# Patient Record
Sex: FEMALE | Race: WHITE | Employment: OTHER | URBAN - METROPOLITAN AREA
[De-identification: names, ages, dates, MRNs, and addresses within clinical notes are randomized per-mention and may not be internally consistent; named-entity substitution may affect disease eponyms.]

---

## 2017-08-07 ENCOUNTER — HOSPITAL ENCOUNTER (OUTPATIENT)
Dept: ULTRASOUND IMAGING | Age: 62
Discharge: HOME | End: 2017-08-07

## 2017-08-07 DIAGNOSIS — R74.01 ELEVATED TRANSAMINASE LEVEL: ICD-10-CM

## 2017-08-07 PROCEDURE — 76705 ECHO EXAM OF ABDOMEN: CPT

## 2023-06-21 ENCOUNTER — CLINICAL DOCUMENTATION (OUTPATIENT)
Age: 68
End: 2023-06-21

## 2023-07-31 ENCOUNTER — CLINICAL DOCUMENTATION (OUTPATIENT)
Age: 68
End: 2023-07-31

## 2023-08-10 ENCOUNTER — CLINICAL DOCUMENTATION (OUTPATIENT)
Age: 68
End: 2023-08-10

## 2024-03-18 RX ORDER — SODIUM CHLORIDE 9 MG/ML
25 INJECTION, SOLUTION INTRAVENOUS PRN
Status: DISCONTINUED | OUTPATIENT
Start: 2024-03-18 | End: 2024-03-19 | Stop reason: HOSPADM

## 2024-03-18 RX ORDER — SODIUM CHLORIDE 0.9 % (FLUSH) 0.9 %
5-40 SYRINGE (ML) INJECTION EVERY 12 HOURS SCHEDULED
Status: DISCONTINUED | OUTPATIENT
Start: 2024-03-18 | End: 2024-03-19 | Stop reason: HOSPADM

## 2024-03-18 RX ORDER — MULTIVIT-MIN/IRON/FOLIC ACID/K 18-600-40
CAPSULE ORAL DAILY
COMMUNITY

## 2024-03-18 RX ORDER — SODIUM CHLORIDE 0.9 % (FLUSH) 0.9 %
5-40 SYRINGE (ML) INJECTION PRN
Status: DISCONTINUED | OUTPATIENT
Start: 2024-03-18 | End: 2024-03-19 | Stop reason: HOSPADM

## 2024-03-19 ENCOUNTER — HOSPITAL ENCOUNTER (OUTPATIENT)
Facility: HOSPITAL | Age: 69
Setting detail: OUTPATIENT SURGERY
Discharge: HOME | End: 2024-03-19
Attending: SPECIALIST | Admitting: SPECIALIST
Payer: MEDICARE

## 2024-03-19 ENCOUNTER — ANESTHESIA EVENT (OUTPATIENT)
Facility: HOSPITAL | Age: 69
End: 2024-03-19
Payer: MEDICARE

## 2024-03-19 ENCOUNTER — ANESTHESIA (OUTPATIENT)
Facility: HOSPITAL | Age: 69
End: 2024-03-19
Payer: MEDICARE

## 2024-03-19 VITALS
DIASTOLIC BLOOD PRESSURE: 73 MMHG | TEMPERATURE: 97.8 F | WEIGHT: 149 LBS | BODY MASS INDEX: 27.42 KG/M2 | HEART RATE: 70 BPM | OXYGEN SATURATION: 98 % | SYSTOLIC BLOOD PRESSURE: 156 MMHG | HEIGHT: 62 IN | RESPIRATION RATE: 14 BRPM

## 2024-03-19 PROCEDURE — 3600007512: Performed by: SPECIALIST

## 2024-03-19 PROCEDURE — 3600007502: Performed by: SPECIALIST

## 2024-03-19 PROCEDURE — 2720000010 HC SURG SUPPLY STERILE: Performed by: SPECIALIST

## 2024-03-19 PROCEDURE — 2580000003 HC RX 258

## 2024-03-19 PROCEDURE — 6360000002 HC RX W HCPCS

## 2024-03-19 PROCEDURE — 2709999900 HC NON-CHARGEABLE SUPPLY: Performed by: SPECIALIST

## 2024-03-19 PROCEDURE — 3700000001 HC ADD 15 MINUTES (ANESTHESIA): Performed by: SPECIALIST

## 2024-03-19 PROCEDURE — 7100000010 HC PHASE II RECOVERY - FIRST 15 MIN: Performed by: SPECIALIST

## 2024-03-19 PROCEDURE — 7100000011 HC PHASE II RECOVERY - ADDTL 15 MIN: Performed by: SPECIALIST

## 2024-03-19 PROCEDURE — 2580000003 HC RX 258: Performed by: SPECIALIST

## 2024-03-19 PROCEDURE — 2500000003 HC RX 250 WO HCPCS

## 2024-03-19 PROCEDURE — 3700000000 HC ANESTHESIA ATTENDED CARE: Performed by: SPECIALIST

## 2024-03-19 RX ORDER — LIDOCAINE HYDROCHLORIDE 20 MG/ML
INJECTION, SOLUTION EPIDURAL; INFILTRATION; INTRACAUDAL; PERINEURAL PRN
Status: DISCONTINUED | OUTPATIENT
Start: 2024-03-19 | End: 2024-03-19 | Stop reason: SDUPTHER

## 2024-03-19 RX ORDER — 0.9 % SODIUM CHLORIDE 0.9 %
INTRAVENOUS SOLUTION INTRAVENOUS PRN
Status: DISCONTINUED | OUTPATIENT
Start: 2024-03-19 | End: 2024-03-19 | Stop reason: SDUPTHER

## 2024-03-19 RX ORDER — PHENYLEPHRINE HCL IN 0.9% NACL 0.4MG/10ML
SYRINGE (ML) INTRAVENOUS PRN
Status: DISCONTINUED | OUTPATIENT
Start: 2024-03-19 | End: 2024-03-19 | Stop reason: SDUPTHER

## 2024-03-19 RX ADMIN — Medication 80 MCG: at 08:22

## 2024-03-19 RX ADMIN — LIDOCAINE HYDROCHLORIDE 50 MG: 20 INJECTION, SOLUTION EPIDURAL; INFILTRATION; INTRACAUDAL; PERINEURAL at 08:17

## 2024-03-19 RX ADMIN — Medication 80 MCG: at 08:31

## 2024-03-19 RX ADMIN — SODIUM CHLORIDE 25 ML: 9 INJECTION, SOLUTION INTRAVENOUS at 07:33

## 2024-03-19 RX ADMIN — SODIUM CHLORIDE 500 ML: 900 INJECTION, SOLUTION INTRAVENOUS at 08:32

## 2024-03-19 RX ADMIN — PROPOFOL 220 MG: 10 INJECTION, EMULSION INTRAVENOUS at 08:31

## 2024-03-19 ASSESSMENT — PAIN - FUNCTIONAL ASSESSMENT: PAIN_FUNCTIONAL_ASSESSMENT: 0-10

## 2024-03-19 ASSESSMENT — PAIN SCALES - GENERAL: PAINLEVEL_OUTOF10: 0

## 2024-03-19 NOTE — DISCHARGE INSTRUCTIONS
Lillian Pettit  [id number]  1955    COLON DISCHARGE INSTRUCTIONS  Discomfort:  Redness at IV site- apply warm compress to area; if redness or soreness persist- contact your physician  There may be a slight amount of blood passed from the rectum  Gaseous discomfort- walking, belching will help relieve any discomfort  You may not operate a vehicle for 12 hours  You may not engage in an occupation involving machinery or appliances for rest of today  You may not drink alcoholic beverages for at least 12 hours  Avoid making any critical decisions for at least 24 hour  DIET:   Regular diet.   - however -  remember your colon is empty and a heavy meal will produce gas.   Avoid these foods:  vegetables, fried / greasy foods, carbonated drinks for today.    MEDICATIONS:        Regarding Aspirin or Nonsteroidal medications, please see below.    ACTIVITY:  You may resume your normal daily activities it is recommended that you spend the remainder of the day resting -  avoid any strenuous activity.    CALL M.D.  ANY SIGN OF:  Increasing pain, nausea, vomiting  Abdominal distension (swelling)  New increased bleeding (oral or rectal)  Fever (chills)  Pain in chest area  Bloody discharge from nose or mouth  Shortness of breath  Tylenol as needed for pain.      Follow-up Instructions:   Call Dr. Amaya for questions about procedure at telephone #  [phone]              Repeat Colonoscopy in 10 years                Patient Education on Sedation / Analgesia Administered for Procedure      For 24 hours after general anesthesia or intravenous analgesia / sedation:  Have someone responsible help you with your care  Limit your activities  Do not drive and operate hazardous machinery  Do not make important personal, legal or business decisions  Do not drink alcoholic beverages  If you have not urinated within 8 hours after discharge, please contact your physician  Resume your medications unless otherwise instructed    For 24 hours

## 2024-03-19 NOTE — PERIOP NOTE
Endoscopy Case End Note:    0837:  Procedure scope was pre-cleaned, per protocol, at bedside by Diane CAGLE      0837:  Report received from anesthesia - Adam CRNA.  See anesthesia flowsheet for intra-procedure vital signs and events.

## 2024-03-19 NOTE — ANESTHESIA POSTPROCEDURE EVALUATION
Department of Anesthesiology  Postprocedure Note    Patient: Lillian Pettit  MRN: [id number]  YOB: 1955  Date of evaluation: 3/19/2024    Procedure Summary       Date: 03/19/24 Room / Location: [facility] ENDO 03 / [facility] END[facility]COPY    Anesthesia Start: 0817 Anesthesia Stop: 0840    Procedure: COLONOSCOPY BIOPSY (Lower GI Region) Diagnosis:       Elevated liver enzymes      (Elevated liver enzymes [R74.8])    Surgeons: Pierce Amaya MD Responsible Provider: Duane Rashid MD    Anesthesia Type: general, TIVA ASA Status: 2            Anesthesia Type: No value filed.    Enma Phase I: Enma Score: 10    Enma Phase II: Enma Score: 10    Anesthesia Post Evaluation    Patient location during evaluation: bedside  Patient participation: complete - patient participated  Level of consciousness: awake  Pain score: 0  Airway patency: patent  Nausea & Vomiting: no nausea and no vomiting  Cardiovascular status: blood pressure returned to baseline  Respiratory status: acceptable  Hydration status: euvolemic  Pain management: adequate    No notable events documented.

## 2024-03-19 NOTE — ANESTHESIA PRE PROCEDURE
BP Readings from Last 3 Encounters:   03/19/24 (!) 156/74       NPO Status: Time of last liquid consumption: 1030                        Time of last solid consumption: 1800                        Date of last liquid consumption: 03/18/24                        Date of last solid food consumption: 03/17/24    BMI:   Wt Readings from Last 3 Encounters:   03/19/24 67.6 kg (149 lb)     Body mass index is 27.25 kg/m².    CBC: No results found for: \"WBC\", \"RBC\", \"HGB\", \"HCT\", \"MCV\", \"RDW\", \"PLT\"    CMP: No results found for: \"NA\", \"K\", \"CL\", \"CO2\", \"BUN\", \"CREATININE\", \"GFRAA\", \"AGRATIO\", \"LABGLOM\", \"GLUCOSE\", \"GLU\", \"PROT\", \"CALCIUM\", \"BILITOT\", \"ALKPHOS\", \"AST\", \"ALT\"    POC Tests: No results for input(s): \"POCGLU\", \"POCNA\", \"POCK\", \"POCCL\", \"POCBUN\", \"POCHEMO\", \"POCHCT\" in the last 72 hours.    Coags: No results found for: \"PROTIME\", \"INR\", \"APTT\"    HCG (If Applicable): No results found for: \"PREGTESTUR\", \"PREGSERUM\", \"HCG\", \"HCGQUANT\"     ABGs: No results found for: \"PHART\", \"PO2ART\", \"TFV9YTC\", \"LOG4EGQ\", \"BEART\", \"L5FRHJGH\"     Type & Screen (If Applicable):  No results found for: \"LABABO\", \"LABRH\"    Drug/Infectious Status (If Applicable):  No results found for: \"HIV\", \"HEPCAB\"    COVID-19 Screening (If Applicable): No results found for: \"COVID19\"        Anesthesia Evaluation  Patient summary reviewed and Nursing notes reviewed  Airway: Mallampati: II          Dental: normal exam         Pulmonary:Negative Pulmonary ROS and normal exam                               Cardiovascular:Negative CV ROS  Exercise tolerance: good (>4 METS)                    Neuro/Psych:   Negative Neuro/Psych ROS              GI/Hepatic/Renal: Neg GI/Hepatic/Renal ROS  (+) bowel prep         ROS comment: Elevated Liver Enzymes..   Endo/Other: Negative Endo/Other ROS                    Abdominal: normal exam            Vascular: negative vascular ROS.         Other Findings:         Anesthesia Plan      general and TIVA     ASA 2

## 2024-03-19 NOTE — H&P
Gastroenterology Outpatient History and Physical    Patient: Lillian Pettit    Physician: Pierce Amaya MD    Vital Signs: Blood pressure (!) 156/74, pulse 73, temperature 98.1 °F (36.7 °C), temperature source Temporal, resp. rate 13, height 1.575 m (5' 2\"), weight 67.6 kg (149 lb), SpO2 96 %.    Allergies: No Known Allergies    Chief Complaint: CRC screening    History of Present Illness: above    Justification for Procedure: above    History:  History reviewed. No pertinent past medical history.   Past Surgical History:   Procedure Laterality Date    CARPAL TUNNEL RELEASE Bilateral     KNEE ARTHROSCOPY W/ MENISCAL REPAIR Right     OTHER SURGICAL HISTORY Right     foot- bunionectomy    TONSILLECTOMY        Social History     Socioeconomic History    Marital status:      Spouse name: None    Number of children: None    Years of education: None    Highest education level: None   Tobacco Use    Smoking status: Former     Types: Cigarettes    Smokeless tobacco: Never   Vaping Use    Vaping Use: Never used   Substance and Sexual Activity    Alcohol use: Yes     Alcohol/week: 4.0 standard drinks of alcohol     Types: 4 Glasses of wine per week    Drug use: Not Currently      Family History   Problem Relation Age of Onset    Dementia Mother     Cancer Father         bladder    No Known Problems Sister     No Known Problems Brother        Medications:   Prior to Admission medications    Medication Sig Start Date End Date Taking? Authorizing Provider   Cholecalciferol (VITAMIN D) 50 MCG (2000 UT) CAPS capsule Take by mouth daily   Yes Provider, MD Derrick       Physical Exam:   General: NAD   HEENT: Head: Normocephalic, no lesions, without obvious abnormality.   Heart: regular rate and rhythm, S1, S2 normal, no murmur, click, rub or gallop   Lungs: chest clear, no wheezing, rales, normal symmetric air entry   Abdominal: soft, NT/ND + BS   Neurological: Grossly normal   Extremities: extremities normal,

## (undated) DEVICE — TRAP ENDOSCP POLYP 2 CHMBR DRAWER TYP

## (undated) DEVICE — CUFF BLD PRSS AD CLTH SGL TB W/ BAYNT CONN ROUNDED CORNER

## (undated) DEVICE — FORCEPS BX L240CM JAW DIA2.4MM ORNG L CAP W/ NDL DISP RAD

## (undated) DEVICE — FIAPC® PROBE W/ FILTER 2200 C OD 2.3MM/6.9FR; L 2.2M/7.2FT: Brand: ERBE

## (undated) DEVICE — CONTAINER SPEC 20 ML LID NEUT BUFF FORMALIN 10 % POLYPR STS

## (undated) DEVICE — INJECTION THERAPY NEEDLE CATHETER: Brand: INTERJECT

## (undated) DEVICE — ENDOSCOPIC KIT COMPLIANCE ENDOKIT

## (undated) DEVICE — ELECTRODE PT RET AD L9FT HI MOIST COND ADH HYDRGEL CORDED

## (undated) DEVICE — FIAPC® PROBE W/ FILTER 2200 A OD 2.3MM/6.9FR; L 2.2M/7.2FT: Brand: ERBE

## (undated) DEVICE — SET GRAV CK VLV NEEDLESS ST 3 GANGED 4WAY STPCOCK HI FLO 10

## (undated) DEVICE — IV START KIT: Brand: MEDLINE

## (undated) DEVICE — SNARE ENDOSCP POLYP MED STD AD 2.4X27X240 CM 2.8 MM OVL SENS

## (undated) DEVICE — TIP SUCT TRNSPAR RIB SURF STD BLB RIG NVENT W/ 5IN1 CONN DYND50138] MEDLINE INDUSTRIES INC]